# Patient Record
Sex: FEMALE | Race: WHITE | Employment: OTHER | URBAN - METROPOLITAN AREA
[De-identification: names, ages, dates, MRNs, and addresses within clinical notes are randomized per-mention and may not be internally consistent; named-entity substitution may affect disease eponyms.]

---

## 2019-11-28 ENCOUNTER — APPOINTMENT (OUTPATIENT)
Dept: GENERAL RADIOLOGY | Facility: HOSPITAL | Age: 74
End: 2019-11-28
Attending: EMERGENCY MEDICINE
Payer: MEDICARE

## 2019-11-28 ENCOUNTER — HOSPITAL ENCOUNTER (EMERGENCY)
Facility: HOSPITAL | Age: 74
Discharge: HOME | End: 2019-11-28
Attending: EMERGENCY MEDICINE
Payer: MEDICARE

## 2019-11-28 VITALS
DIASTOLIC BLOOD PRESSURE: 67 MMHG | WEIGHT: 138 LBS | RESPIRATION RATE: 18 BRPM | OXYGEN SATURATION: 98 % | TEMPERATURE: 98 F | HEART RATE: 73 BPM | SYSTOLIC BLOOD PRESSURE: 120 MMHG

## 2019-11-28 DIAGNOSIS — S82.891A CLOSED FRACTURE OF RIGHT ANKLE, INITIAL ENCOUNTER: Primary | ICD-10-CM

## 2019-11-28 PROCEDURE — 73610 X-RAY EXAM OF ANKLE: CPT | Performed by: EMERGENCY MEDICINE

## 2019-11-28 PROCEDURE — 27818 TREATMENT OF ANKLE FRACTURE: CPT

## 2019-11-28 PROCEDURE — 96376 TX/PRO/DX INJ SAME DRUG ADON: CPT

## 2019-11-28 PROCEDURE — 99285 EMERGENCY DEPT VISIT HI MDM: CPT

## 2019-11-28 PROCEDURE — 99283 EMERGENCY DEPT VISIT LOW MDM: CPT

## 2019-11-28 PROCEDURE — 96374 THER/PROPH/DIAG INJ IV PUSH: CPT

## 2019-11-28 PROCEDURE — 99284 EMERGENCY DEPT VISIT MOD MDM: CPT

## 2019-11-28 RX ORDER — MORPHINE SULFATE 4 MG/ML
4 INJECTION, SOLUTION INTRAMUSCULAR; INTRAVENOUS EVERY 30 MIN PRN
Status: DISCONTINUED | OUTPATIENT
Start: 2019-11-28 | End: 2019-11-28

## 2019-11-28 RX ORDER — HYDROCODONE BITARTRATE AND ACETAMINOPHEN 5; 325 MG/1; MG/1
1-2 TABLET ORAL EVERY 6 HOURS PRN
Qty: 10 TABLET | Refills: 0 | Status: ON HOLD | OUTPATIENT
Start: 2019-11-28 | End: 2019-12-06

## 2019-11-28 NOTE — ED NOTES
Family is here ans states that patient did not actually hit her head. Patient denies pain in the head. There is no injury noted to head.

## 2019-11-28 NOTE — ED INITIAL ASSESSMENT (HPI)
Per patient, pivoted on her right foot and heard a crack. She then fell to the floor, hitting her right hip and back of head. Denies loss consciousness or prescription medication. AAO X 4. Only complaint is right ankle pain and swelling.      Ankle is splin

## 2019-11-28 NOTE — ED PROVIDER NOTES
Patient Seen in: [facility] Emergency Department      History   Patient presents with:  Trauma (cardiovascular, musculoskeletal)  Lower Extremity Injury (musculoskeletal)    Stated Complaint: Ankle injury    HPI    79-year-old female presents for ev Ankle (min 3 Views), Right (cpt=73610)    Result Date: 11/28/2019  PROCEDURE:  XR ANKLE (MIN 3 VIEWS), RIGHT (CPT=73610)  TECHNIQUE:  Three views were obtained. COMPARISON:  None.   INDICATIONS:  Ankle injury  PATIENT STATED HISTORY: (As transcribed by Johana Clinical Impression:  Closed fracture of right ankle, initial encounter  (primary encounter diagnosis)    Disposition:  There is no disposition on file for this visit. There is no disposition time on file for this visit.     Follow-up:  Altamese Query,

## 2019-11-29 NOTE — CM/SW NOTE
Pt denies pain. Reviewed RICE.  Able to wiggle toes  Nephew will be picking up discharge papers and Rx later today  Ortho appt made for monday

## 2019-12-03 NOTE — H&P
[facility]    PATIENT'S NAME: Ghazala IZQUIERDOLYN   ATTENDING PHYSICIAN: Mac Nieto MD   PATIENT ACCOUNT#:   [id number]    LOCATION:    MEDICAL RECORD #:   [id number]       YOB: 1945  ADMISSION DATE:       12/04/2019    HI Regular rate and rhythm, normal S1 and S2, without murmurs or rubs. ABDOMEN:  Soft and nontender. Normal bowel sounds. GENITALIA/RECTAL:  Deferred. EXTREMITIES:  Upper extremities have good range of motion, show no acute abnormalities.   Left lower ext

## 2019-12-04 ENCOUNTER — ANESTHESIA (OUTPATIENT)
Dept: SURGERY | Facility: HOSPITAL | Age: 74
End: 2019-12-04
Payer: MEDICARE

## 2019-12-04 ENCOUNTER — ANESTHESIA EVENT (OUTPATIENT)
Dept: SURGERY | Facility: HOSPITAL | Age: 74
End: 2019-12-04
Payer: MEDICARE

## 2019-12-04 ENCOUNTER — HOSPITAL ENCOUNTER (OUTPATIENT)
Facility: HOSPITAL | Age: 74
Discharge: NURSING, REHAB OR LONG-TERM CARE | End: 2019-12-06
Attending: ORTHOPAEDIC SURGERY | Admitting: ORTHOPAEDIC SURGERY
Payer: MEDICARE

## 2019-12-04 ENCOUNTER — APPOINTMENT (OUTPATIENT)
Dept: GENERAL RADIOLOGY | Facility: HOSPITAL | Age: 74
End: 2019-12-04
Attending: ORTHOPAEDIC SURGERY
Payer: MEDICARE

## 2019-12-04 DIAGNOSIS — S82.891A CLOSED FRACTURE OF RIGHT ANKLE, INITIAL ENCOUNTER: ICD-10-CM

## 2019-12-04 DIAGNOSIS — S82.841A BIMALLEOLAR ANKLE FRACTURE, RIGHT, CLOSED, INITIAL ENCOUNTER: ICD-10-CM

## 2019-12-04 PROCEDURE — 0QSG04Z REPOSITION RIGHT TIBIA WITH INTERNAL FIXATION DEVICE, OPEN APPROACH: ICD-10-PCS | Performed by: ORTHOPAEDIC SURGERY

## 2019-12-04 PROCEDURE — 0QSJ04Z REPOSITION RIGHT FIBULA WITH INTERNAL FIXATION DEVICE, OPEN APPROACH: ICD-10-PCS | Performed by: ORTHOPAEDIC SURGERY

## 2019-12-04 PROCEDURE — 76000 FLUOROSCOPY <1 HR PHYS/QHP: CPT | Performed by: ORTHOPAEDIC SURGERY

## 2019-12-04 PROCEDURE — 87641 MR-STAPH DNA AMP PROBE: CPT | Performed by: ORTHOPAEDIC SURGERY

## 2019-12-04 DEVICE — IMPLANTABLE DEVICE: Type: IMPLANTABLE DEVICE | Site: ANKLE | Status: FUNCTIONAL

## 2019-12-04 DEVICE — SCREW BN 2.7MM 12MM SS ELB: Type: IMPLANTABLE DEVICE | Site: ANKLE | Status: FUNCTIONAL

## 2019-12-04 DEVICE — PLATE 80MM SS 2.7-3.5MM SCR 4: Type: IMPLANTABLE DEVICE | Site: ANKLE | Status: FUNCTIONAL

## 2019-12-04 DEVICE — SCREW CORT 2.7X16 4827-16-01: Type: IMPLANTABLE DEVICE | Site: ANKLE | Status: FUNCTIONAL

## 2019-12-04 DEVICE — SCREW CORT 3.5X24 4835-24-01: Type: IMPLANTABLE DEVICE | Site: ANKLE | Status: FUNCTIONAL

## 2019-12-04 DEVICE — SCREW BN 2.7MM 14MM SS ELB: Type: IMPLANTABLE DEVICE | Site: ANKLE | Status: FUNCTIONAL

## 2019-12-04 DEVICE — SCREW CORT 3.5X14 2348-14-35: Type: IMPLANTABLE DEVICE | Site: ANKLE | Status: FUNCTIONAL

## 2019-12-04 RX ORDER — FAMOTIDINE 20 MG/1
20 TABLET ORAL ONCE
Status: DISCONTINUED | OUTPATIENT
Start: 2019-12-04 | End: 2019-12-04 | Stop reason: HOSPADM

## 2019-12-04 RX ORDER — DOCUSATE SODIUM 100 MG/1
100 CAPSULE, LIQUID FILLED ORAL 2 TIMES DAILY
Status: DISCONTINUED | OUTPATIENT
Start: 2019-12-04 | End: 2019-12-06

## 2019-12-04 RX ORDER — LIDOCAINE HYDROCHLORIDE 10 MG/ML
INJECTION, SOLUTION EPIDURAL; INFILTRATION; INTRACAUDAL; PERINEURAL AS NEEDED
Status: DISCONTINUED | OUTPATIENT
Start: 2019-12-04 | End: 2019-12-04 | Stop reason: SURG

## 2019-12-04 RX ORDER — SODIUM CHLORIDE, SODIUM LACTATE, POTASSIUM CHLORIDE, CALCIUM CHLORIDE 600; 310; 30; 20 MG/100ML; MG/100ML; MG/100ML; MG/100ML
INJECTION, SOLUTION INTRAVENOUS CONTINUOUS
Status: DISCONTINUED | OUTPATIENT
Start: 2019-12-04 | End: 2019-12-04

## 2019-12-04 RX ORDER — SODIUM CHLORIDE 0.9 % (FLUSH) 0.9 %
3 SYRINGE (ML) INJECTION AS NEEDED
Status: DISCONTINUED | OUTPATIENT
Start: 2019-12-04 | End: 2019-12-06

## 2019-12-04 RX ORDER — MORPHINE SULFATE 4 MG/ML
2 INJECTION, SOLUTION INTRAMUSCULAR; INTRAVENOUS EVERY 10 MIN PRN
Status: DISCONTINUED | OUTPATIENT
Start: 2019-12-04 | End: 2019-12-04 | Stop reason: HOSPADM

## 2019-12-04 RX ORDER — ACETAMINOPHEN 325 MG/1
650 TABLET ORAL EVERY 4 HOURS PRN
Status: DISCONTINUED | OUTPATIENT
Start: 2019-12-04 | End: 2019-12-06

## 2019-12-04 RX ORDER — SODIUM PHOSPHATE, DIBASIC AND SODIUM PHOSPHATE, MONOBASIC 7; 19 G/133ML; G/133ML
1 ENEMA RECTAL ONCE AS NEEDED
Status: DISCONTINUED | OUTPATIENT
Start: 2019-12-04 | End: 2019-12-06

## 2019-12-04 RX ORDER — DEXAMETHASONE SODIUM PHOSPHATE 4 MG/ML
VIAL (ML) INJECTION AS NEEDED
Status: DISCONTINUED | OUTPATIENT
Start: 2019-12-04 | End: 2019-12-04 | Stop reason: SURG

## 2019-12-04 RX ORDER — HYDROMORPHONE HYDROCHLORIDE 1 MG/ML
0.8 INJECTION, SOLUTION INTRAMUSCULAR; INTRAVENOUS; SUBCUTANEOUS EVERY 2 HOUR PRN
Status: DISCONTINUED | OUTPATIENT
Start: 2019-12-04 | End: 2019-12-06

## 2019-12-04 RX ORDER — ACETAMINOPHEN 500 MG
1000 TABLET ORAL ONCE
Status: COMPLETED | OUTPATIENT
Start: 2019-12-04 | End: 2019-12-04

## 2019-12-04 RX ORDER — POLYETHYLENE GLYCOL 3350 17 G/17G
17 POWDER, FOR SOLUTION ORAL DAILY PRN
Status: DISCONTINUED | OUTPATIENT
Start: 2019-12-04 | End: 2019-12-06

## 2019-12-04 RX ORDER — ONDANSETRON 2 MG/ML
4 INJECTION INTRAMUSCULAR; INTRAVENOUS EVERY 6 HOURS PRN
Status: DISCONTINUED | OUTPATIENT
Start: 2019-12-04 | End: 2019-12-06

## 2019-12-04 RX ORDER — HYDROCODONE BITARTRATE AND ACETAMINOPHEN 5; 325 MG/1; MG/1
1 TABLET ORAL AS NEEDED
Status: DISCONTINUED | OUTPATIENT
Start: 2019-12-04 | End: 2019-12-04 | Stop reason: HOSPADM

## 2019-12-04 RX ORDER — SODIUM CHLORIDE 0.9 % (FLUSH) 0.9 %
10 SYRINGE (ML) INJECTION AS NEEDED
Status: DISCONTINUED | OUTPATIENT
Start: 2019-12-04 | End: 2019-12-06

## 2019-12-04 RX ORDER — MORPHINE SULFATE 4 MG/ML
4 INJECTION, SOLUTION INTRAMUSCULAR; INTRAVENOUS EVERY 10 MIN PRN
Status: DISCONTINUED | OUTPATIENT
Start: 2019-12-04 | End: 2019-12-04 | Stop reason: HOSPADM

## 2019-12-04 RX ORDER — CEFAZOLIN SODIUM/WATER 2 G/20 ML
2 SYRINGE (ML) INTRAVENOUS EVERY 8 HOURS
Status: COMPLETED | OUTPATIENT
Start: 2019-12-05 | End: 2019-12-05

## 2019-12-04 RX ORDER — MORPHINE SULFATE 10 MG/ML
6 INJECTION, SOLUTION INTRAMUSCULAR; INTRAVENOUS EVERY 10 MIN PRN
Status: DISCONTINUED | OUTPATIENT
Start: 2019-12-04 | End: 2019-12-04 | Stop reason: HOSPADM

## 2019-12-04 RX ORDER — BUPIVACAINE HYDROCHLORIDE 5 MG/ML
INJECTION, SOLUTION EPIDURAL; INTRACAUDAL AS NEEDED
Status: DISCONTINUED | OUTPATIENT
Start: 2019-12-04 | End: 2019-12-04 | Stop reason: HOSPADM

## 2019-12-04 RX ORDER — MIDAZOLAM HYDROCHLORIDE 1 MG/ML
INJECTION INTRAMUSCULAR; INTRAVENOUS AS NEEDED
Status: DISCONTINUED | OUTPATIENT
Start: 2019-12-04 | End: 2019-12-04 | Stop reason: SURG

## 2019-12-04 RX ORDER — HYDROCODONE BITARTRATE AND ACETAMINOPHEN 5; 325 MG/1; MG/1
2 TABLET ORAL EVERY 4 HOURS PRN
Status: DISCONTINUED | OUTPATIENT
Start: 2019-12-04 | End: 2019-12-06

## 2019-12-04 RX ORDER — EPHEDRINE SULFATE 50 MG/ML
INJECTION, SOLUTION INTRAVENOUS AS NEEDED
Status: DISCONTINUED | OUTPATIENT
Start: 2019-12-04 | End: 2019-12-04 | Stop reason: SURG

## 2019-12-04 RX ORDER — ZOLPIDEM TARTRATE 5 MG/1
5 TABLET ORAL NIGHTLY PRN
Status: DISCONTINUED | OUTPATIENT
Start: 2019-12-04 | End: 2019-12-06

## 2019-12-04 RX ORDER — SODIUM CHLORIDE, SODIUM LACTATE, POTASSIUM CHLORIDE, CALCIUM CHLORIDE 600; 310; 30; 20 MG/100ML; MG/100ML; MG/100ML; MG/100ML
INJECTION, SOLUTION INTRAVENOUS CONTINUOUS
Status: DISCONTINUED | OUTPATIENT
Start: 2019-12-04 | End: 2019-12-04 | Stop reason: HOSPADM

## 2019-12-04 RX ORDER — HALOPERIDOL 5 MG/ML
0.25 INJECTION INTRAMUSCULAR ONCE AS NEEDED
Status: DISCONTINUED | OUTPATIENT
Start: 2019-12-04 | End: 2019-12-04 | Stop reason: HOSPADM

## 2019-12-04 RX ORDER — NALOXONE HYDROCHLORIDE 0.4 MG/ML
80 INJECTION, SOLUTION INTRAMUSCULAR; INTRAVENOUS; SUBCUTANEOUS AS NEEDED
Status: DISCONTINUED | OUTPATIENT
Start: 2019-12-04 | End: 2019-12-04 | Stop reason: HOSPADM

## 2019-12-04 RX ORDER — HYDROMORPHONE HYDROCHLORIDE 1 MG/ML
0.2 INJECTION, SOLUTION INTRAMUSCULAR; INTRAVENOUS; SUBCUTANEOUS EVERY 5 MIN PRN
Status: DISCONTINUED | OUTPATIENT
Start: 2019-12-04 | End: 2019-12-04 | Stop reason: HOSPADM

## 2019-12-04 RX ORDER — HYDROMORPHONE HYDROCHLORIDE 1 MG/ML
0.2 INJECTION, SOLUTION INTRAMUSCULAR; INTRAVENOUS; SUBCUTANEOUS EVERY 2 HOUR PRN
Status: DISCONTINUED | OUTPATIENT
Start: 2019-12-04 | End: 2019-12-06

## 2019-12-04 RX ORDER — HYDROMORPHONE HYDROCHLORIDE 1 MG/ML
0.4 INJECTION, SOLUTION INTRAMUSCULAR; INTRAVENOUS; SUBCUTANEOUS EVERY 2 HOUR PRN
Status: DISCONTINUED | OUTPATIENT
Start: 2019-12-04 | End: 2019-12-06

## 2019-12-04 RX ORDER — HYDROCODONE BITARTRATE AND ACETAMINOPHEN 5; 325 MG/1; MG/1
1 TABLET ORAL EVERY 4 HOURS PRN
Status: DISCONTINUED | OUTPATIENT
Start: 2019-12-04 | End: 2019-12-06

## 2019-12-04 RX ORDER — CEFAZOLIN SODIUM/WATER 2 G/20 ML
2 SYRINGE (ML) INTRAVENOUS ONCE
Status: COMPLETED | OUTPATIENT
Start: 2019-12-04 | End: 2019-12-04

## 2019-12-04 RX ORDER — HYDROMORPHONE HYDROCHLORIDE 1 MG/ML
0.4 INJECTION, SOLUTION INTRAMUSCULAR; INTRAVENOUS; SUBCUTANEOUS EVERY 5 MIN PRN
Status: DISCONTINUED | OUTPATIENT
Start: 2019-12-04 | End: 2019-12-04 | Stop reason: HOSPADM

## 2019-12-04 RX ORDER — PROCHLORPERAZINE EDISYLATE 5 MG/ML
5 INJECTION INTRAMUSCULAR; INTRAVENOUS ONCE AS NEEDED
Status: DISCONTINUED | OUTPATIENT
Start: 2019-12-04 | End: 2019-12-04 | Stop reason: HOSPADM

## 2019-12-04 RX ORDER — DEXTROSE AND SODIUM CHLORIDE 5; .45 G/100ML; G/100ML
INJECTION, SOLUTION INTRAVENOUS CONTINUOUS
Status: DISCONTINUED | OUTPATIENT
Start: 2019-12-04 | End: 2019-12-06

## 2019-12-04 RX ORDER — ONDANSETRON 2 MG/ML
INJECTION INTRAMUSCULAR; INTRAVENOUS AS NEEDED
Status: DISCONTINUED | OUTPATIENT
Start: 2019-12-04 | End: 2019-12-04 | Stop reason: SURG

## 2019-12-04 RX ORDER — METOCLOPRAMIDE 10 MG/1
10 TABLET ORAL ONCE
Status: DISCONTINUED | OUTPATIENT
Start: 2019-12-04 | End: 2019-12-04 | Stop reason: HOSPADM

## 2019-12-04 RX ORDER — BISACODYL 10 MG
10 SUPPOSITORY, RECTAL RECTAL
Status: DISCONTINUED | OUTPATIENT
Start: 2019-12-04 | End: 2019-12-06

## 2019-12-04 RX ORDER — METOCLOPRAMIDE HYDROCHLORIDE 5 MG/ML
10 INJECTION INTRAMUSCULAR; INTRAVENOUS EVERY 8 HOURS PRN
Status: DISCONTINUED | OUTPATIENT
Start: 2019-12-04 | End: 2019-12-06

## 2019-12-04 RX ORDER — HYDROCODONE BITARTRATE AND ACETAMINOPHEN 5; 325 MG/1; MG/1
2 TABLET ORAL AS NEEDED
Status: DISCONTINUED | OUTPATIENT
Start: 2019-12-04 | End: 2019-12-04 | Stop reason: HOSPADM

## 2019-12-04 RX ORDER — ONDANSETRON 2 MG/ML
4 INJECTION INTRAMUSCULAR; INTRAVENOUS ONCE AS NEEDED
Status: DISCONTINUED | OUTPATIENT
Start: 2019-12-04 | End: 2019-12-04 | Stop reason: HOSPADM

## 2019-12-04 RX ORDER — HYDROMORPHONE HYDROCHLORIDE 1 MG/ML
0.6 INJECTION, SOLUTION INTRAMUSCULAR; INTRAVENOUS; SUBCUTANEOUS EVERY 5 MIN PRN
Status: DISCONTINUED | OUTPATIENT
Start: 2019-12-04 | End: 2019-12-04 | Stop reason: HOSPADM

## 2019-12-04 RX ADMIN — MIDAZOLAM HYDROCHLORIDE 0.5 MG: 1 INJECTION INTRAMUSCULAR; INTRAVENOUS at 15:41:00

## 2019-12-04 RX ADMIN — LIDOCAINE HYDROCHLORIDE 30 MG: 10 INJECTION, SOLUTION EPIDURAL; INFILTRATION; INTRACAUDAL; PERINEURAL at 15:46:00

## 2019-12-04 RX ADMIN — SODIUM CHLORIDE, SODIUM LACTATE, POTASSIUM CHLORIDE, CALCIUM CHLORIDE: 600; 310; 30; 20 INJECTION, SOLUTION INTRAVENOUS at 17:03:00

## 2019-12-04 RX ADMIN — ONDANSETRON 4 MG: 2 INJECTION INTRAMUSCULAR; INTRAVENOUS at 15:54:00

## 2019-12-04 RX ADMIN — CEFAZOLIN SODIUM/WATER 2 G: 2 G/20 ML SYRINGE (ML) INTRAVENOUS at 15:54:00

## 2019-12-04 RX ADMIN — DEXAMETHASONE SODIUM PHOSPHATE 4 MG: 4 MG/ML VIAL (ML) INJECTION at 15:54:00

## 2019-12-04 RX ADMIN — EPHEDRINE SULFATE 10 MG: 50 INJECTION, SOLUTION INTRAVENOUS at 15:52:00

## 2019-12-04 NOTE — ANESTHESIA POSTPROCEDURE EVALUATION
Patient: Nayana Velásquez    Procedure Summary     Date:  12/04/19 Room / Location:  [facility] OR 04 / [facility] OR    Anesthesia Start:  9231 Anesthesia Stop:      Procedure:  ANKLE OPEN REDUCTION INTERNAL FIXATION (Right Ankle) Diagnosis:       Closed

## 2019-12-04 NOTE — OPERATIVE REPORT
See dictation  Pre op : right ankle bi malleolar fx  Post op: CRISTÓBAL  Procedure: ORIF Right ankle  Surgeon: Lin Gonzales  Asst: Didier Lu SA  Complications: none  Drains: None  EBL: 20 cc's

## 2019-12-04 NOTE — ANESTHESIA PROCEDURE NOTES
Airway  Urgency: Elective    Airway not difficult    General Information and Staff    Patient location during procedure: OR  Anesthesiologist: Quinn Amezquita MD  Resident/CRNA: Rocio Palacios CRNA  Performed: anesthesiologist and CRNA     Ind

## 2019-12-04 NOTE — ANESTHESIA PREPROCEDURE EVALUATION
Anesthesia PreOp Note    HPI:     Dayana Golden is a 75year old female who presents for preoperative consultation requested by: Kipp Cockayne, MD    Date of Surgery: 12/4/2019    Procedure(s):  ANKLE OPEN REDUCTION INTERNAL FIXATION  Indicatio History      Not on file    Social Needs      Financial resource strain: Not on file      Food insecurity:        Worry: Not on file        Inability: Not on file      Transportation needs:        Medical: Not on file        Non-medical: Not on file    Tob good    NYHA Classification: I    Neuro/Psych - negative ROS     GI/Hepatic/Renal - negative ROS     Endo/Other - negative ROS   Abdominal                Anesthesia Plan:   ASA:  2  Plan:   General  Airway:  LMA  Post-op Pain Management: IV analgesics

## 2019-12-04 NOTE — INTERVAL H&P NOTE
Pre-op Diagnosis: Closed fracture of right ankle, initial encounter [S82.931A]  Bimalleolar ankle fracture, right, closed, initial encounter [R40.761M]    The above referenced H&P was reviewed by Elizabeth Rosales MD on 12/4/2019, the patient was examined Unknown

## 2019-12-05 PROCEDURE — 97162 PT EVAL MOD COMPLEX 30 MIN: CPT

## 2019-12-05 PROCEDURE — 97530 THERAPEUTIC ACTIVITIES: CPT

## 2019-12-05 PROCEDURE — 97110 THERAPEUTIC EXERCISES: CPT

## 2019-12-05 PROCEDURE — 80048 BASIC METABOLIC PNL TOTAL CA: CPT | Performed by: ORTHOPAEDIC SURGERY

## 2019-12-05 PROCEDURE — 85025 COMPLETE CBC W/AUTO DIFF WBC: CPT | Performed by: ORTHOPAEDIC SURGERY

## 2019-12-05 PROCEDURE — 97166 OT EVAL MOD COMPLEX 45 MIN: CPT

## 2019-12-05 NOTE — OPERATIVE REPORT
[facility]    PATIENT'S NAME: Chun FARLEY   ATTENDING PHYSICIAN: Mac Aragon MD   OPERATING PHYSICIAN: Mac Aragon MD   PATIENT ACCOUNT#:   [id number]    LOCATION:  [facility] [id number] A [facility]  MEDICAL RECORD #:   [id number] and draped in the usual sterile fashion with careful protection of the ankle to prevent soft tissue trauma. Time-out was performed. The limb was exsanguinated using an Esmarch compressive dressing. Tourniquet inflated to 300 mmHg.   The procedure was beg fashion, and assurance of adequate positioning of the guidewire was made with fluoroscopy. A 44 mm and a 40 mm long thread 4-0 cannulated screws were then placed over the guidewires with good compression.   Careful assurance that there was no impingement o

## 2019-12-05 NOTE — CM/SW NOTE
12/6 431pm: The pt's insurance has approved transfer to rehab. The pt. Is scheduled to discharge to [facility] today 12/6 at 630p, via [address] [facility]. The pt's sister Brittney Sam is aware of discharge and medicar costs.     PCS is on the chart for medical Stated she had been scooting up and down the stairs on her bottom, and rolling around on a rolator walker as she was afraid she would fall or put weight on her ankle if she used the walker. PT is recommending rehab at this time. SW provided the pt.  With

## 2019-12-05 NOTE — OCCUPATIONAL THERAPY NOTE
OCCUPATIONAL THERAPY EVALUATION - INPATIENT      Room Number: SDSOF14/RTHOM39-P  Evaluation Date: 12/5/2019  Type of Evaluation: Initial       Physician Order: IP Consult to Occupational Therapy  Reason for Therapy: ADL/IADL Dysfunction and Discharge Plann overall difficulty (especially maintaining NWB). Pt donned underwear with Min A and CGA for standing balance.     The patient's Approx Degree of Impairment: 50.11% has been calculated based on documentation in the [facility] '6 clicks' Inpatient Daily Activity S movement  Management Techniques: Activity promotion; Body mechanics    ACTIVITY TOLERANCE  Room air    COGNITION  Following Commands:  follows one step commands without difficulty  Safety Judgement:  decreased awareness of need for assistance and decreased Patient End of Session: In bed;Needs met;Call light within reach;RN aware of session/findings    OT Goals  Patient self-stated goal is: home     Patient will complete LE dressing with SBA  Comment:     Patient will complete toilet transfer with SBA  Co

## 2019-12-05 NOTE — PLAN OF CARE
Pt in good spirits. Up to bathroom x2. Tolerated diet well. Dilaudid given x1 with good results.   Problem: Patient Centered Care  Goal: Patient preferences are identified and integrated in the patient's plan of care  Description  Interventions:  - What with activity and pre-medicate as appropriate  Outcome: Progressing     Problem: SAFETY ADULT - FALL  Goal: Free from fall injury  Description  INTERVENTIONS:  - Assess pt frequently for physical needs  - Identify cognitive and physical deficits and behavi

## 2019-12-05 NOTE — PROGRESS NOTES
[facility] Hospitalist Progress Note     CC: Hospital Follow up    PCP: None Pcp       Assessment/Plan:     Active Problems:    Closed fracture of right ankle, initial encounter    74 yo female without significant PMH who presented for ORIF of right ankle for kary HCT 32.4*   MCV 90.5   MCH 28.8   MCHC 31.8   RDW 13.2   NEPRELIM 7.34   WBC 9.1   .0*         Recent Labs   Lab 12/05/19  0457   *   BUN 9   CREATSERUM 0.77   GFRAA 87   GFRNAA 76   CA 8.2*      K 4.2      CO2 27.0       No res

## 2019-12-05 NOTE — H&P
[facility] Hospitalist H&P       CC: No chief complaint on file. PCP: None Pcp    History of Present Illness:  Ms. Oanh Dao is a 74 yo female without significant PMH who presented for ORIF of right ankle for closed bimalleolar fracture.  Patient has been fee CBC/Chem  No results for input(s): WBC, HGB, MCV, PLT, BAND, INR in the last 168 hours. Invalid input(s): LYM#, MONO#, BASOS#, EOSIN#    No results for input(s): NA, K, CL, CO2, BUN, CREATSERUM, GLU, CA, CAION, MG, PHOS in the last 168 hours.     No re continues to be subluxation of the tibia medially by 1.5 cm compared to the talar dome. There is also medial displacement of the proximal fibula by 7 mm. There is an acute trimalleolar fracture.   The overall alignment after casting is not significantly c bimalleolar fracture.      # Bimalleolar right ankle fracture s/p ORIF  - management per surgery  - check labs in AM  - DVT ppx when ok from surgical standpoint    # Acute post-op pain  - IV pain meds prn, transition to PO as tolerated    Prophy:  DVT: SCDs

## 2019-12-05 NOTE — PLAN OF CARE
Problem: Patient/Family Goals  Goal: Patient/Family Long Term Goal  Description  Patient's Long Term Goal: get well and get back to caring for my handicapped sister    Interventions:  - probable rehab upon discharge and postoperative care  - See addition schedule  Outcome: Progressing     Patient alert and oriented. Surgical dressing and mold on right leg. CMS intact. Voiding. Pain managed with norcos. Ice packs. Tolerating diet. NWB, using stand & pivot to rolling chair.  Call light within reach, using tacos

## 2019-12-05 NOTE — PLAN OF CARE
Problem: Patient Centered Care  Goal: Patient preferences are identified and integrated in the patient's plan of care  Description  Interventions:  - What would you like us to know as we care for you?   - Provide timely, complete, and accurate informatio FALL  Goal: Free from fall injury  Description  INTERVENTIONS:  - Assess pt frequently for physical needs  - Identify cognitive and physical deficits and behaviors that affect risk of falls.   - [place] fall precautions as indicated by assessment.  - Educ assistance  - Assess pain using appropriate pain scale  - Administer analgesics based on type and severity of pain and evaluate response  - Implement non-pharmacological measures as appropriate and evaluate response  - Consider cultural and social influenc

## 2019-12-05 NOTE — PHYSICAL THERAPY NOTE
PHYSICAL THERAPY EVALUATION - INPATIENT     Room Number: SDSOF14/NVVBD17-B  Evaluation Date: 12/5/2019  Type of Evaluation: Initial   Physician Order: PT Eval and Treat    Presenting Problem: R ankle ORIF   Reason for Therapy: Mobility Dysfunction and Dis is not safe to return home at this time. The patient's Approx Degree of Impairment: 57.7% has been calculated based on documentation in the [facility] '6 clicks' Inpatient Basic Mobility Short Form.   Research supports that patients with this level of impairment MOTION AND STRENGTH ASSESSMENT    Lower extremity ROM is within functional limits LLE WNL    Lower extremity strength is within functional limits LLE WNL    BALANCE  Static Sitting: Good  Dynamic Sitting: Good  Static Standing: Fair -  Dynamic Standing: Po #4 Patient will negotiate 15 stairs/one curb w/ assistive device and supervision   Goal #4   Current Status    Goal #5 Patient to demonstrate independence with home activity/exercise instructions provided to patient in preparation for discharge.    Goal #5

## 2019-12-06 VITALS
BODY MASS INDEX: 25.91 KG/M2 | WEIGHT: 132 LBS | HEIGHT: 60 IN | RESPIRATION RATE: 18 BRPM | DIASTOLIC BLOOD PRESSURE: 56 MMHG | SYSTOLIC BLOOD PRESSURE: 103 MMHG | HEART RATE: 71 BPM | TEMPERATURE: 98 F | OXYGEN SATURATION: 100 %

## 2019-12-06 PROCEDURE — 97110 THERAPEUTIC EXERCISES: CPT

## 2019-12-06 PROCEDURE — 97116 GAIT TRAINING THERAPY: CPT

## 2019-12-06 PROCEDURE — 97530 THERAPEUTIC ACTIVITIES: CPT

## 2019-12-06 PROCEDURE — 97535 SELF CARE MNGMENT TRAINING: CPT

## 2019-12-06 RX ORDER — ASPIRIN 325 MG
325 TABLET, DELAYED RELEASE (ENTERIC COATED) ORAL DAILY
Status: DISCONTINUED | OUTPATIENT
Start: 2019-12-06 | End: 2019-12-06

## 2019-12-06 RX ORDER — HYDROCODONE BITARTRATE AND ACETAMINOPHEN 5; 325 MG/1; MG/1
1 TABLET ORAL EVERY 8 HOURS PRN
Qty: 15 TABLET | Refills: 0 | Status: SHIPPED | OUTPATIENT
Start: 2019-12-06 | End: 2019-12-11

## 2019-12-06 NOTE — PLAN OF CARE
Patient settled in, vss, eating dinner, norco given before coming up. Foot elevated with ice packs. Endorsed to night rn.

## 2019-12-06 NOTE — PROGRESS NOTES
[facility] Hospitalist Progress Note     CC: Hospital Follow up    PCP: None Pcp       Assessment/Plan:     Active Problems:    Closed fracture of right ankle, initial encounter    76 yo female without significant PMH who presented for ORIF of right ankle for kary MCV 90.5   MCH 28.8   MCHC 31.8   RDW 13.2   NEPRELIM 7.34   WBC 9.1   .0*         Recent Labs   Lab 12/05/19  0457   *   BUN 9   CREATSERUM 0.77   GFRAA 87   GFRNAA 76   CA 8.2*      K 4.2      CO2 27.0       No results for inp

## 2019-12-06 NOTE — PLAN OF CARE
Problem: Patient Centered Care  Goal: Patient preferences are identified and integrated in the patient's plan of care  Description  Interventions:  - What would you like us to know as we care for you?  [place] and rachel ankle on thanksgiving  - Provide timel pt/family on patient safety including physical limitations  - Instruct pt to call for assistance with activity based on assessment  - Modify environment to reduce risk of injury  - Provide assistive devices as appropriate  - Consider OT/PT consult to jose

## 2019-12-06 NOTE — OCCUPATIONAL THERAPY NOTE
OCCUPATIONAL THERAPY TREATMENT NOTE - INPATIENT        Room Number: 950O/151-E                Problem List  Active Problems:    Closed fracture of right ankle, initial encounter      OCCUPATIONAL THERAPY ASSESSMENT     Patient continues to be quite impulsi clothing?: A Little  -   Bathing (including washing, rinsing, drying)?: A Little  -   Toileting, which includes using toilet, bedpan or urinal? : A Lot  -   Putting on and taking off regular upper body clothing?: A Little  -   Taking care of personal groom

## 2019-12-06 NOTE — PROGRESS NOTES
POD#2 s/p ORIF right ankle  Pain well controlled on orals  No nausea     note and recommendations noted.     Splint intact and comfortable  DNVI    Imp: POD#2 s/p ORIF right ankle - ortho stable    Plan: Transfer to rehab when bed available  No

## 2019-12-06 NOTE — PHYSICAL THERAPY NOTE
PHYSICAL THERAPY TREATMENT NOTE - INPATIENT     Room Number: 135S/564-P       Presenting Problem: R ankle ORIF     Problem List  Active Problems:    Closed fracture of right ankle, initial encounter      PHYSICAL THERAPY ASSESSMENT     Patient's current fu care/supervision; Outpatient PT     PLAN  PT Treatment Plan: Endurance;Gait training;Balance training;Transfer training    SUBJECTIVE  I will get up     OBJECTIVE  Precautions: Limb alert - right    WEIGHT BEARING RESTRICTION  Weight Bearing Restriction: R pumps  Glut sets  Knee extension  Leg slides     Position Sitting       Patient End of Session: Up in chair;Call light within reach; Needs met;RN aware of session/findings; Alarm set    CURRENT GOALS     Goals to be met by: 12/20/19  Patient Goal Patient's s

## 2019-12-09 PROCEDURE — 99304 1ST NF CARE SF/LOW MDM 25: CPT | Performed by: INTERNAL MEDICINE

## 2019-12-10 ENCOUNTER — SNF DISCHARGE (OUTPATIENT)
Dept: INTERNAL MEDICINE CLINIC | Facility: SKILLED NURSING FACILITY | Age: 74
End: 2019-12-10

## 2019-12-10 VITALS
DIASTOLIC BLOOD PRESSURE: 60 MMHG | SYSTOLIC BLOOD PRESSURE: 122 MMHG | WEIGHT: 160 LBS | TEMPERATURE: 97 F | HEART RATE: 62 BPM | BODY MASS INDEX: 31 KG/M2 | RESPIRATION RATE: 20 BRPM

## 2019-12-10 DIAGNOSIS — S82.891A CLOSED FRACTURE OF RIGHT ANKLE, INITIAL ENCOUNTER: ICD-10-CM

## 2019-12-10 DIAGNOSIS — D69.6 THROMBOCYTOPENIA (HCC): ICD-10-CM

## 2019-12-10 DIAGNOSIS — Z98.890 S/P ORIF (OPEN REDUCTION INTERNAL FIXATION) FRACTURE: ICD-10-CM

## 2019-12-10 DIAGNOSIS — Z87.81 S/P ORIF (OPEN REDUCTION INTERNAL FIXATION) FRACTURE: ICD-10-CM

## 2019-12-10 PROCEDURE — 99316 NF DSCHRG MGMT 30 MIN+: CPT | Performed by: NURSE PRACTITIONER

## 2019-12-10 NOTE — PROGRESS NOTES
Alexandru Earing  : 10/12/1945  Age 75year old  female patient is admitted to [place] [address] for rehabilitation and strengthening. Chief complaint: Fall, pain in right ankle    HPI  18-year-old female presents for evaluation after a fall.   Shaye DRAINS:  none  SKIN: no rashes, no suspicious lesions, warm, dry  WOUND: under cast, unable to assess  EYES: PERRLA, EOMI, sclera anicteric, conjunctiva normal; there is no nystagmus, no drainage from eyes  HENT: normocephalic; normal nose, no nasal draina

## 2019-12-12 ENCOUNTER — EXTERNAL FACILITY (OUTPATIENT)
Dept: INTERNAL MEDICINE CLINIC | Facility: CLINIC | Age: 74
End: 2019-12-12

## 2019-12-12 DIAGNOSIS — S82.891A CLOSED FRACTURE OF RIGHT ANKLE, INITIAL ENCOUNTER: ICD-10-CM

## 2020-01-07 PROBLEM — S82.841D CLOSED DISPLACED BIMALLEOLAR FRACTURE OF RIGHT LOWER LEG WITH ROUTINE HEALING: Status: ACTIVE | Noted: 2020-01-07

## 2020-01-10 NOTE — DISCHARGE SUMMARY
[facility]    PATIENT'S NAME: Sendy Naranjo MARTINE   ATTENDING PHYSICIAN: Mac Spears MD   PATIENT ACCOUNT#:   [id number]    LOCATION:  [address] [place] [address] RECORD #:   [id number]       YOB: 1945  ADMISSION DATE: [facility] for additional nonweightbearing ambulatory training. She is scheduled to follow up in 10 days with Dr. Fermín Soto. Dictated By Gianna Eason.  Fermín Soto MD  d: 01/10/2020 12:27:03  t: 01/10/2020 12:44:53  Job [id number]  RLR/

## (undated) DEVICE — SPLINT PRECUT SYNTH 5X30

## (undated) DEVICE — Device

## (undated) DEVICE — C-ARMOR C-ARM EQUIPMENT COVERS CLEAR STERILE UNIVERSAL FIT 12 PER CASE: Brand: C-ARMOR

## (undated) DEVICE — SUTURE VICRYL 0 CP-1

## (undated) DEVICE — BATTERY

## (undated) DEVICE — SOL  .9 1000ML BTL

## (undated) DEVICE — 3M™ STERI-DRAPE™ U-DRAPE 1015: Brand: STERI-DRAPE™

## (undated) DEVICE — REM POLYHESIVE ADULT PATIENT RETURN ELECTRODE: Brand: VALLEYLAB

## (undated) DEVICE — PROXIMATE SKIN STAPLERS (35 WIDE) CONTAINS 35 STAINLESS STEEL STAPLES (FIXED HEAD): Brand: PROXIMATE

## (undated) DEVICE — SUCTION CANISTER, 3000CC,SAFELINER: Brand: DEROYAL

## (undated) DEVICE — DRAPE C-ARM UNIVERSAL

## (undated) DEVICE — ENCORE® LATEX ACCLAIM SIZE 8, STERILE LATEX POWDER-FREE SURGICAL GLOVE: Brand: ENCORE

## (undated) DEVICE — CLIPPER BLADE 3M

## (undated) DEVICE — DRILL BIT ZIM 3.5 4806-110-35

## (undated) DEVICE — DRAPE SRG 70X60IN SPLT U IMPRV

## (undated) DEVICE — DRILL BIT ZIM 2.5 4806-110-25

## (undated) DEVICE — COVER SGL STRL LGHT HNDL BLU

## (undated) DEVICE — 3M™ IOBAN™ 2 ANTIMICROBIAL INCISE DRAPE 6650EZ: Brand: IOBAN™ 2

## (undated) DEVICE — CHLORAPREP 26ML APPLICATOR

## (undated) DEVICE — SUTURE VICRYL 2-0 FS-1

## (undated) DEVICE — DRILL BIT ZIM 2.0 2360-175-20

## (undated) DEVICE — LOWER EXTREMITY: Brand: MEDLINE INDUSTRIES, INC.

## (undated) DEVICE — TOURNIQUET CUFF 34 STR

## (undated) DEVICE — SOL H2O 1000ML BTL

## (undated) NOTE — IP AVS SNAPSHOT
Patient Demographics     Address  [address] [place] [address] Phone  [phone] [facility]  [phone] [facility]      Emergency Contact(s)     Name Relation Home Work Michael Agustin   [phone]      Allergies as of 12/6/2019  R · If you had laparoscopic surgery, to feel shoulder pain or discomfort on the day of surgery. · For some patients to have nausea after surgery/anesthesia    If you feel nausea or experience vomiting:   · Try to move around less.    · Eat less than usual o Most Recent Value   Vitals  103/56 Filed at 12/06/2019 1346   Pulse  71 Filed at 12/06/2019 1346   Resp  18 Filed at 12/06/2019 1346   Temp  98.2 °F (36.8 °C) Filed at 12/06/2019 1346   SpO2  100 % Filed at 12/06/2019 1346      Patient's Most Recent Weigh • Cancer Father         Lung   • Other (Alzheimer's ) Mother        Review of Systems  12 point ROS negative, except as stated in HPI    OBJECTIVE:  /60   Pulse 78   Temp 98 °F (36.7 °C) (Temporal)   Resp 15   Ht 152.4 cm (5')   Wt 132 lb (59.9 kg) of the left ankle status post trimalleolar fracture. However there still is medial subluxation of the tibia compared to the talar dome by 1 cm. The fibular fracture is near anatomic in its alignment. There is soft tissue swelling.   There is widening of hitting her right hip and back of head. Denies loss  consciousness or prescription medication. AAO X 4. Only  complaint is right  ankle pain and swelling. Ankle is splinted by EMS. Splint removed  To visualize injury and replaced  with blanket splints.  An Electronically signed by Marlene Maldonado MD on 12/4/2019  6:24 PM   Attribution Richter    SB. 1 - Marlene Maldonado MD on 12/4/2019  6:21 PM                     D/C Summary    No notes of this type exist for this encounter.         Physical Therapy Notes (last Patient states her bed at home is high and she has been sleeping in a bedside chair.  Patient does not have the physical skills to return to prior living environment upon D/C from the hospital. Anticipate patient will benefit from continued skilled physical How much help from another person does the patient currently need. ..   -   Moving to and from a bed to a chair (including a wheelchair)?: A Little   -   Need to walk in hospital room?: A Little   -   Climbing 3-5 steps with a railing?: A Lot     AM-PAC Sco AM  Version 1 of 1    Author:  Dianne Montes De Oca PT Service:  — Author Type:  Physical Therapist    Filed:  12/5/2019  9:08 AM Date of Service:  12/5/2019  8:53 AM Status:  Signed    :  Dianne Montes De Oca PT (Physical Therapist)        PHYSICAL TH bedside chair.  Patient does not have the physical skills to return to prior living environment upon D/C from the hospital. Anticipate patient will benefit from continued skilled physical therapy while in the hospital and upon D/C from the hospital at a [facility] Weight Bearing Restriction: R lower extremity        R Lower Extremity: Non-Weight Bearing       PAIN ASSESSMENT  Ratin  Location: R ankle  Management Techniques: Activity promotion; Body mechanics;Repositioning    COGNITION  · Overall Cognitive Status: Goal #2 Patient is able to demonstrate transfers Sit to/from Stand at assistance level: supervision with walker - rolling     Goal #2  Current Status    Goal #3 Patient is able to ambulate 10 feet with assist device: walker - rolling at assistance level: s calculated based on documentation in the [facility] '6 clicks' Inpatient Daily Activity Short Form. Research supports that patients with this level of impairment may benefit from JULIÁN.     DISCHARGE RECOMMENDATIONS  OT Discharge Recommendations: Sub-acute rehabi Education Provided: Role of therapy, POC, Safety, Rehab recommendations  Patient End of Session: Up in chair;Needs met;Call light within reach;RN aware of session/findings; All patient questions and concerns addressed    OT Goals:       OT Goals  Patient se maximizing patient's ability to return to prior level of function. RN cleared pt for participation in occupational therapy session, which was completed in collaboration with physical therapy. Upon arrival, pt supine in bed and agreeable to activity.  No OCCUPATIONAL THERAPY MEDICAL/SOCIAL HISTORY     Problem List   Active Problems:    Closed fracture of right ankle, initial encounter      Past Medical History  History reviewed. No pertinent past medical history.     Past Surgical History  Past Surgical His Upper extremity strength is within functional limits     ACTIVITIES OF DAILY LIVING ASSESSMENT  AM-PAC ‘6-Clicks’ Inpatient Daily Activity Short Form  How much help from another person does the patient currently need…  -   Putting on and taking off regular Occupational Therapist    Filed:  12/5/2019  4:52 PM Date of Service:  12/5/2019  8:19 AM Status:  Signed    :  Wilfrido Zuniga OT (Occupational Therapist)    Related Notes:  Addendum by Wilfrido Zuniga OT (Occupational Therapist) f transfers. She required Min A to complete sit<>stand and up to Mod A for transfer, especially to a higher surface. Pt with difficulty hopping, mostly taking shuffling steps.  Anticipate pt was having difficulty crawling around her home and bumping her self OCCUPATIONAL THERAPY EXAMINATION     OBJECTIVE  Precautions: Limb alert - right  Fall Risk: High fall risk    WEIGHT BEARING RESTRICTION  Weight Bearing Restriction: R lower extremity        R Lower Extremity: Non-Weight Bearing       PAIN ASSESSMENT  Rati BALANCE ASSESSMENT  Static Sitting: fair  Dynamic Sitting: fair  Static Standing: poor+    FUNCTIONAL ADL ASSESSMENT  Grooming: set-up in sitting   Feeding: set-up   Bathing: NT  Toileting: ind  Upper Body Dressing: NT  Lower Body Dressing: Min A    Educ

## (undated) NOTE — IP AVS SNAPSHOT
[facility]            (For Outpatient Use Only) Initial Admit Date: 12/4/2019   Inpt/Obs Admit Date: Inpt: N/A / Obs: N/A   Discharge Date:    Hospital Acct:  [de-identified]   MRN: [de-identified]   CSN: [id number]   CEID: [id number]        [employer] Hospital Account Financial Class: Medicare Advantage    December 6, 2019

## (undated) NOTE — ED AVS SNAPSHOT
[address] [facility]  [facility] [place] [address]  Phone:  [phone]  Fax:  Jude   MRN: [id number]    Department:  [facility] Emergency Department   Date o visit does not uncover every injury or illness.  If you have been referred to a primary care or a specialist physician for a follow-up visit, please tell this physician (or your personal doctor if your instructions are to return to your personal doctor) abo

## (undated) NOTE — ED AVS SNAPSHOT
Jessica Tolliver   MRN: [id number]    Department:  [facility] Emergency Department   Date of Visit:  11/28/2019           Disclosure     Insurance plans vary and the physician(s) referred by the ER may not be covered by your plan.  Please contact your tell this physician (or your personal doctor if your instructions are to return to your personal doctor) about any new or lasting problems. The primary care or specialist physician will see patients referred from the [facility] Emergency Department.  Harshil Marcial